# Patient Record
Sex: FEMALE | Race: WHITE | Employment: FULL TIME | URBAN - NONMETROPOLITAN AREA
[De-identification: names, ages, dates, MRNs, and addresses within clinical notes are randomized per-mention and may not be internally consistent; named-entity substitution may affect disease eponyms.]

---

## 2023-06-13 ENCOUNTER — TELEPHONE (OUTPATIENT)
Dept: OBGYN CLINIC | Age: 28
End: 2023-06-13

## 2023-07-10 ENCOUNTER — HOSPITAL ENCOUNTER (OUTPATIENT)
Age: 28
Setting detail: SPECIMEN
Discharge: HOME | End: 2023-07-10

## 2023-07-10 DIAGNOSIS — N92.0 MENORRHAGIA WITH REGULAR CYCLE: ICD-10-CM

## 2023-07-10 LAB — B-HCG SERPL EIA 3RD IS-ACNC: <1 MIU/ML

## 2023-07-11 ENCOUNTER — PROCEDURE VISIT (OUTPATIENT)
Dept: OBGYN CLINIC | Age: 28
End: 2023-07-11
Payer: COMMERCIAL

## 2023-07-11 VITALS — DIASTOLIC BLOOD PRESSURE: 68 MMHG | SYSTOLIC BLOOD PRESSURE: 110 MMHG | WEIGHT: 165 LBS | BODY MASS INDEX: 30.18 KG/M2

## 2023-07-11 DIAGNOSIS — Z30.430 ENCOUNTER FOR INSERTION OF INTRAUTERINE CONTRACEPTIVE DEVICE (IUD): Primary | ICD-10-CM

## 2023-07-11 PROCEDURE — 58300 INSERT INTRAUTERINE DEVICE: CPT | Performed by: NURSE PRACTITIONER

## 2023-07-11 RX ORDER — LEVONORGESTREL 52 MG/1
INTRAUTERINE DEVICE INTRAUTERINE
COMMUNITY
Start: 2023-06-15

## 2023-07-11 NOTE — PROGRESS NOTES
The patient is a 32 y.o. female that presents for IUD insertion for menorrhagia and dysmenorrhea. OB History          0    Para   0    Term   0       0    AB   0    Living   0         SAB   0    IAB   0    Ectopic   0    Molar   0    Multiple   0    Live Births   0                Allergies: Gentamicin sulfate, Zithromax [azithromycin], and Codeine    Vitals: Blood pressure 110/68, weight 165 lb (74.8 kg), last menstrual period 2023. Premedicated with Motrin 800 mg: Yes    Cytotec 200mcg:No    Serum hcg: negative    Consent signed:  Yes    PROCEDURE:    Franc[employer]ia Lot # [id number], Expiration date 3/26      Bimanual exam: anteverted    Cervix cleansed with: Hibiclens-Alcoholx3    Tenaculum applied: Yes     Uterus sounded: 8cm    Francis Cordia Lot # [id number], Expiration date 3/26 inserted without difficulty. IUD strings trimmed to 3 cm. Assessment:   Diagnosis Orders   1. Encounter for insertion of intrauterine contraceptive device (IUD)              Plan:  Education on IUD  Abstain from intercourse for 72 hours  Motrin 800 mg Q 8 hours PRN  RTO one month for ultrasound for sting check. See [facility] for lot # and NDC #    Return in about 4 weeks (around 2023) for string check .     SNOW Lundberg - NP,2023 9:40 AM

## 2023-07-26 ENCOUNTER — TELEPHONE (OUTPATIENT)
Dept: OBGYN CLINIC | Age: 28
End: 2023-07-26

## 2023-07-26 SDOH — ECONOMIC STABILITY: HOUSING INSECURITY
IN THE LAST 12 MONTHS, WAS THERE A TIME WHEN YOU DID NOT HAVE A STEADY PLACE TO SLEEP OR SLEPT IN A SHELTER (INCLUDING NOW)?: NO

## 2023-07-26 SDOH — ECONOMIC STABILITY: INCOME INSECURITY: HOW HARD IS IT FOR YOU TO PAY FOR THE VERY BASICS LIKE FOOD, HOUSING, MEDICAL CARE, AND HEATING?: NOT VERY HARD

## 2023-07-26 SDOH — ECONOMIC STABILITY: FOOD INSECURITY: WITHIN THE PAST 12 MONTHS, YOU WORRIED THAT YOUR FOOD WOULD RUN OUT BEFORE YOU GOT MONEY TO BUY MORE.: NEVER TRUE

## 2023-07-26 SDOH — ECONOMIC STABILITY: TRANSPORTATION INSECURITY
IN THE PAST 12 MONTHS, HAS LACK OF TRANSPORTATION KEPT YOU FROM MEETINGS, WORK, OR FROM GETTING THINGS NEEDED FOR DAILY LIVING?: NO

## 2023-07-26 SDOH — ECONOMIC STABILITY: FOOD INSECURITY: WITHIN THE PAST 12 MONTHS, THE FOOD YOU BOUGHT JUST DIDN'T LAST AND YOU DIDN'T HAVE MONEY TO GET MORE.: NEVER TRUE

## 2023-07-26 ASSESSMENT — PATIENT HEALTH QUESTIONNAIRE - PHQ9
SUM OF ALL RESPONSES TO PHQ QUESTIONS 1-9: 2
1. LITTLE INTEREST OR PLEASURE IN DOING THINGS: SEVERAL DAYS
2. FEELING DOWN, DEPRESSED OR HOPELESS: SEVERAL DAYS
SUM OF ALL RESPONSES TO PHQ QUESTIONS 1-9: 2
SUM OF ALL RESPONSES TO PHQ9 QUESTIONS 1 & 2: 2
2. FEELING DOWN, DEPRESSED OR HOPELESS: 1
SUM OF ALL RESPONSES TO PHQ QUESTIONS 1-9: 2
SUM OF ALL RESPONSES TO PHQ9 QUESTIONS 1 & 2: 2
1. LITTLE INTEREST OR PLEASURE IN DOING THINGS: 1
SUM OF ALL RESPONSES TO PHQ QUESTIONS 1-9: 2

## 2023-07-26 NOTE — TELEPHONE ENCOUNTER
Pt called c/o pain for the past 4 days. Pt has IUD put in about 3 weeks ago. Pain starts as a 2 then progresses to a 10. Pain is sharp and stabbing. Ibuprofen is not helping. No bleeding when pain is a 2 but when get to a 10 has bleeding that last until pain is gone. Per CHEIKH Edmond pt to come in for USN and f/u. Pt aware and appt scheduled for tomorrow.

## 2023-07-27 ENCOUNTER — OFFICE VISIT (OUTPATIENT)
Dept: OBGYN CLINIC | Age: 28
End: 2023-07-27
Payer: COMMERCIAL

## 2023-07-27 VITALS — SYSTOLIC BLOOD PRESSURE: 112 MMHG | DIASTOLIC BLOOD PRESSURE: 71 MMHG | BODY MASS INDEX: 30.73 KG/M2 | WEIGHT: 168 LBS

## 2023-07-27 DIAGNOSIS — R10.2 PELVIC PAIN: Primary | ICD-10-CM

## 2023-07-27 DIAGNOSIS — Z30.431 INTRAUTERINE DEVICE SURVEILLANCE: ICD-10-CM

## 2023-07-27 PROCEDURE — 99213 OFFICE O/P EST LOW 20 MIN: CPT | Performed by: NURSE PRACTITIONER

## 2023-07-27 ASSESSMENT — ENCOUNTER SYMPTOMS: RESPIRATORY NEGATIVE: 1

## 2023-07-27 NOTE — PROGRESS NOTES
PROBLEM VISIT     Date of service: 2023    Toy Ho  Is a 32 y.o. female    PT's PCP is: No primary care provider on file. : 1995                                             Subjective:       Patient's last menstrual period was 2023. OB History    Para Term  AB Living   0 0 0 0 0 0   SAB IAB Ectopic Molar Multiple Live Births   0 0 0 0 0 0        Social History     Tobacco Use   Smoking Status Never   Smokeless Tobacco Never   Tobacco Comments    I'm unsure of when I started vaping        Social History     Substance and Sexual Activity   Alcohol Use Not Currently    Comment: I drink maybe once or twice a year       Allergies: Gentamicin sulfate, Zithromax [azithromycin], and Codeine      Current Outpatient Medications:     LILETTA, 52 MG, 20.1 MCG/DAY IUD IUD 52 mg, , Disp: , Rfl:     Social History     Substance and Sexual Activity   Sexual Activity Yes    Partners: Male    Birth control/protection: Condom         Chief Complaint   Patient presents with    Follow-up     Follow up usn for pelvic pain. C/O nipple pain with menses. PE:  Vital Signs  Blood pressure 112/71, weight 168 lb (76.2 kg), last menstrual period 2023. HPI: Patient presents today following  ultrasound for pelvic pain. Reports for the past several days she has been experiencing lower abdominal cramping bilaterally. States pain is 2/10. When she is moving and twisting at work she will get brief sharp/shooting pain that last approximately 30 seconds. Denies any s/s of vaginal or urinary infections. Complains of nipple pain/breast tenderness with menses. Denies any lump, skin changes or nipple discharge. PT denies fever, chills, nausea and vomiting       Review of Systems   Constitutional: Negative. Respiratory: Negative. Cardiovascular: Negative. Genitourinary:  Positive for pelvic pain. Negative for difficulty urinating, dysuria, frequency and menstrual problem.

## 2023-10-09 ENCOUNTER — OFFICE VISIT (OUTPATIENT)
Dept: OBGYN CLINIC | Age: 28
End: 2023-10-09
Payer: COMMERCIAL

## 2023-10-09 VITALS
WEIGHT: 172 LBS | SYSTOLIC BLOOD PRESSURE: 111 MMHG | HEIGHT: 62 IN | BODY MASS INDEX: 31.65 KG/M2 | DIASTOLIC BLOOD PRESSURE: 72 MMHG

## 2023-10-09 DIAGNOSIS — Z30.431 INTRAUTERINE DEVICE SURVEILLANCE: Primary | ICD-10-CM

## 2023-10-09 PROCEDURE — 99213 OFFICE O/P EST LOW 20 MIN: CPT | Performed by: NURSE PRACTITIONER

## 2023-10-09 ASSESSMENT — ENCOUNTER SYMPTOMS
GASTROINTESTINAL NEGATIVE: 1
RESPIRATORY NEGATIVE: 1

## 2023-10-09 NOTE — PROGRESS NOTES
PROBLEM VISIT     Date of service: 10/9/2023    Faustina Olivas  Is a 32 y.o. female    PT's PCP is: No primary care provider on file. : 1995                                             Subjective:       Patient's last menstrual period was 2023. OB History    Para Term  AB Living   0 0 0 0 0 0   SAB IAB Ectopic Molar Multiple Live Births   0 0 0 0 0 0        Social History     Tobacco Use   Smoking Status Never   Smokeless Tobacco Never   Tobacco Comments    I'm unsure of when I started vaping        Social History     Substance and Sexual Activity   Alcohol Use Not Currently    Comment: I drink maybe once or twice a year       Allergies: Gentamicin sulfate, Zithromax [azithromycin], and Codeine      Current Outpatient Medications:     LILETTA, 52 MG, 20.1 MCG/DAY IUD IUD 52 mg, , Disp: , Rfl:     Social History     Substance and Sexual Activity   Sexual Activity Yes    Partners: Male    Birth control/protection: Condom         Chief Complaint   Patient presents with    Follow-up     Liletta string check. LMP lasted 2 wks but no bleeding since. PE:  Vital Signs  Blood pressure 111/72, height 5' 2\" (1.575 m), weight 172 lb (78 kg), last menstrual period 2023. HPI: Patient presents today for IUD string check. Reports LMP lasted slightly longer but was light spotting the duration. Denies any further pelvic pain. PT denies fever, chills, nausea and vomiting       Review of Systems   Constitutional: Negative. Respiratory: Negative. Cardiovascular: Negative. Gastrointestinal: Negative. Genitourinary:  Negative for dysuria, frequency, menstrual problem, pelvic pain, vaginal bleeding, vaginal discharge and vaginal pain. Physical Exam  Constitutional:       Appearance: Normal appearance. HENT:      Head: Normocephalic. Pulmonary:      Effort: Pulmonary effort is normal.   Genitourinary:     General: Normal vulva.       Vagina: Normal.

## 2024-05-14 ENCOUNTER — OFFICE VISIT (OUTPATIENT)
Dept: OBGYN CLINIC | Age: 29
End: 2024-05-14
Payer: COMMERCIAL

## 2024-05-14 ENCOUNTER — HOSPITAL ENCOUNTER (OUTPATIENT)
Age: 29
Setting detail: SPECIMEN
Discharge: HOME | End: 2024-05-14

## 2024-05-14 VITALS
SYSTOLIC BLOOD PRESSURE: 110 MMHG | DIASTOLIC BLOOD PRESSURE: 58 MMHG | WEIGHT: 180.8 LBS | BODY MASS INDEX: 33.27 KG/M2 | HEIGHT: 62 IN

## 2024-05-14 DIAGNOSIS — Z12.4 PAP SMEAR FOR CERVICAL CANCER SCREENING: ICD-10-CM

## 2024-05-14 DIAGNOSIS — Z01.419 WOMEN'S ANNUAL ROUTINE GYNECOLOGICAL EXAMINATION: Primary | ICD-10-CM

## 2024-05-14 DIAGNOSIS — Z30.431 INTRAUTERINE DEVICE SURVEILLANCE: ICD-10-CM

## 2024-05-14 PROCEDURE — 99395 PREV VISIT EST AGE 18-39: CPT | Performed by: NURSE PRACTITIONER

## 2024-05-14 ASSESSMENT — ENCOUNTER SYMPTOMS
CONSTIPATION: 0
SHORTNESS OF BREATH: 0
DIARRHEA: 0
ABDOMINAL PAIN: 0

## 2024-05-14 NOTE — PROGRESS NOTES
YEARLY PHYSICAL    Date of service: 2024    Enid Noel  Is a 28 y.o. female    PT's PCP is: No primary care provider on file.     : 1995                                         Chaperone for Intimate Exam  Chaperone was offered as part of the rooming process. Patient declined and agrees to continue with exam without a chaperone.  Chaperone: n/a      Subjective:       No LMP recorded. (Menstrual status: IUD).     Are your menses regular: no menses with IUD    OB History    Para Term  AB Living   0 0 0 0 0 0   SAB IAB Ectopic Molar Multiple Live Births   0 0 0 0 0 0        Social History     Tobacco Use   Smoking Status Never   Smokeless Tobacco Never   Tobacco Comments    I'm unsure of when I started vaping        Social History     Substance and Sexual Activity   Alcohol Use Not Currently    Comment: I drink maybe once or twice a year       Family History   Problem Relation Age of Onset    Mental Illness Father         Has depression, anxiety and OCD    Heart Surgery Maternal Grandfather         Has had at least 2 heart attacks with surgery after       Any family history of breast or ovarian cancer: No    Any family history of blood clots: No      Allergies: Gentamicin sulfate, Zithromax [azithromycin], and Codeine      Current Outpatient Medications:     LILETTA, 52 MG, 20.1 MCG/DAY IUD IUD 52 mg, , Disp: , Rfl:     Social History     Substance and Sexual Activity   Sexual Activity Yes    Partners: Male    Birth control/protection: Condom       Any bleeding or pain with intercourse: No    Last Yearly:  ?    Last pap: ?    Last HPV: n/a    Last Mammogram: n/a    Last Dexascan n/a    Last colorectal screen- n/a    Do you do self breast exams: encouraged     Past Medical History:   Diagnosis Date    Anxiety     Depression     First noticed in high school       Past Surgical History:   Procedure Laterality Date

## 2024-06-03 LAB — CYTOLOGY REPORT: NORMAL
